# Patient Record
Sex: MALE | Race: WHITE | Employment: FULL TIME | URBAN - METROPOLITAN AREA
[De-identification: names, ages, dates, MRNs, and addresses within clinical notes are randomized per-mention and may not be internally consistent; named-entity substitution may affect disease eponyms.]

---

## 2022-03-10 ENCOUNTER — APPOINTMENT (OUTPATIENT)
Dept: GENERAL RADIOLOGY | Age: 22
End: 2022-03-10
Attending: PHYSICIAN ASSISTANT

## 2022-03-10 ENCOUNTER — HOSPITAL ENCOUNTER (EMERGENCY)
Age: 22
Discharge: HOME | End: 2022-03-10
Attending: STUDENT IN AN ORGANIZED HEALTH CARE EDUCATION/TRAINING PROGRAM

## 2022-03-10 VITALS
SYSTOLIC BLOOD PRESSURE: 166 MMHG | BODY MASS INDEX: 33.23 KG/M2 | HEART RATE: 90 BPM | TEMPERATURE: 98.1 F | WEIGHT: 245.37 LBS | HEIGHT: 72 IN | DIASTOLIC BLOOD PRESSURE: 98 MMHG | RESPIRATION RATE: 18 BRPM | OXYGEN SATURATION: 99 %

## 2022-03-10 DIAGNOSIS — S61.452A ANIMAL BITE OF LEFT HAND, INITIAL ENCOUNTER: Primary | ICD-10-CM
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
## 2022-03-10 DIAGNOSIS — S61.451A ANIMAL BITE OF RIGHT HAND, INITIAL ENCOUNTER: ICD-10-CM

## 2022-03-10 PROCEDURE — 90471 IMMUNIZATION ADMIN: CPT

## 2022-03-10 PROCEDURE — 90675 RABIES VACCINE IM: CPT | Performed by: PHYSICIAN ASSISTANT

## 2022-03-10 PROCEDURE — 96372 THER/PROPH/DIAG INJ SC/IM: CPT

## 2022-03-10 PROCEDURE — 73130 X-RAY EXAM OF HAND: CPT

## 2022-03-10 PROCEDURE — 75810000293 HC SIMP/SUPERF WND  RPR

## 2022-03-10 PROCEDURE — 74011250637 HC RX REV CODE- 250/637: Performed by: PHYSICIAN ASSISTANT

## 2022-03-10 PROCEDURE — 90375 RABIES IG IM/SC: CPT | Performed by: PHYSICIAN ASSISTANT

## 2022-03-10 PROCEDURE — 74011250636 HC RX REV CODE- 250/636: Performed by: PHYSICIAN ASSISTANT

## 2022-03-10 PROCEDURE — 74011000250 HC RX REV CODE- 250: Performed by: PHYSICIAN ASSISTANT

## 2022-03-10 PROCEDURE — 99284 EMERGENCY DEPT VISIT MOD MDM: CPT

## 2022-03-10 RX ORDER — LIDOCAINE HYDROCHLORIDE 10 MG/ML
5 INJECTION, SOLUTION EPIDURAL; INFILTRATION; INTRACAUDAL; PERINEURAL ONCE
Status: COMPLETED | OUTPATIENT
Start: 2022-03-10 | End: 2022-03-10

## 2022-03-10 RX ORDER — AMOXICILLIN AND CLAVULANATE POTASSIUM 875; 125 MG/1; MG/1
1 TABLET, FILM COATED ORAL 2 TIMES DAILY
Qty: 14 TABLET | Refills: 0 | Status: SHIPPED | OUTPATIENT
Start: 2022-03-10 | End: 2022-03-17

## 2022-03-10 RX ORDER — BACITRACIN 500 UNIT/G
1 PACKET (EA) TOPICAL
Status: COMPLETED | OUTPATIENT
Start: 2022-03-10 | End: 2022-03-10

## 2022-03-10 RX ORDER — AMOXICILLIN AND CLAVULANATE POTASSIUM 875; 125 MG/1; MG/1
1 TABLET, FILM COATED ORAL
Status: COMPLETED | OUTPATIENT
Start: 2022-03-10 | End: 2022-03-10

## 2022-03-10 RX ADMIN — RABIES VACCINE 2.5 UNITS: KIT at 10:07

## 2022-03-10 RX ADMIN — AMOXICILLIN AND CLAVULANATE POTASSIUM 1 TABLET: 875; 125 TABLET, FILM COATED ORAL at 10:23

## 2022-03-10 RX ADMIN — RABIES IMMUNE GLOBULIN (HUMAN) 2220 UNITS: 300 INJECTION, SOLUTION INFILTRATION; INTRAMUSCULAR at 10:14

## 2022-03-10 RX ADMIN — BACITRACIN 1 PACKET: 500 OINTMENT TOPICAL at 10:36

## 2022-03-10 RX ADMIN — LIDOCAINE HYDROCHLORIDE 5 ML: 10 INJECTION, SOLUTION EPIDURAL; INFILTRATION; INTRACAUDAL; PERINEURAL at 10:13

## 2022-03-10 NOTE — DISCHARGE INSTRUCTIONS
You will receive a phone call from the outpatient infusion center scheduling to set up your next rabies vaccine at the outpatient infusion center which will need to be given on Talha 3/13. The next rabies vaccine shot will need to be given on Thursday 3/17, and Thursday 3/24. Return to the emergency department if there are any issues getting the series completed or if problems arise.
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
## 2022-03-10 NOTE — PROGRESS NOTES
3/10/2022 -   Date of previous inpatient admission/ ED visit? N/A    What brought the patient back to ED? Raccoon Bite    Did patient decline recommended services during last admission/ ED visit (if yes, what)? N/A    Has patient seen a provider since their last inpatient admission/ED visit (if yes, when)? PCP: First and Last name:   Name of Practice:    Are you a current patient: Yes/No:    Approximate date of last visit:    CM Interventions:  From previous inpatient admission/ED visit:  From current inpatient admission/ED visit  CM notes CM Consult for Rabies Vaccine Setup. CM met with patient, with patient alert and oriented x4. Patient confirmed Name, , ins information, address, and phone number. CM confirmed that hard script has been scanned to patient's Media. CM discussed patient's ability to contact ins company to confirm most cost effective method of obtaining remaining needed rabies vaccines. CM identified that [employer] Team will contact patient within the next business day to set up the remaining needed rabies vaccines, if the patient chooses to utilize [employer]. Patient expressed understanding of the above. Patient has physical hard script for vaccines for discharge and is aware of ability to take the hard script to any vaccine provider should they choose to not utilize [employer] services. CM submitted scheduling request to [employer] [address] [employer] [employer] [address] Team via email. [facility] information is on AVS, including request to contact [facility] if patient has not received a call within one business day and the below dates. Day 0: 3/10/2022  Day 3: 3/13/2022  Day 7: 3/17/2022  Day 14: 3/24/2022    Address: [address],[facility], [place], [address]   P: [phone]  Patient does not have a PCP. Patient is not insured   Patient is independent in ADLs, to include driving. 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. Patient has no Home DME.     Patient has no pharmacy preference. Patient has no hx of HH or Rehab.     CRM: Miquel Coy, MPH, [address] [place] [address]; Z: [phone]

## 2022-03-10 NOTE — ED NOTES
Telfa pad applied to L 2nd finger and finger splint applied. Telfa pad applied to R hand and arm and wrapped in haylie.

## 2022-03-13 ENCOUNTER — HOSPITAL ENCOUNTER (OUTPATIENT)
Dept: INFUSION THERAPY | Age: 22
Discharge: HOME | End: 2022-03-13

## 2022-03-13 VITALS
RESPIRATION RATE: 18 BRPM | SYSTOLIC BLOOD PRESSURE: 142 MMHG | DIASTOLIC BLOOD PRESSURE: 82 MMHG | HEART RATE: 82 BPM | TEMPERATURE: 97.6 F | OXYGEN SATURATION: 98 %

## 2022-03-13 PROCEDURE — 90675 RABIES VACCINE IM: CPT | Performed by: STUDENT IN AN ORGANIZED HEALTH CARE EDUCATION/TRAINING PROGRAM

## 2022-03-13 PROCEDURE — 90471 IMMUNIZATION ADMIN: CPT

## 2022-03-13 PROCEDURE — 74011250636 HC RX REV CODE- 250/636: Performed by: STUDENT IN AN ORGANIZED HEALTH CARE EDUCATION/TRAINING PROGRAM

## 2022-03-13 RX ADMIN — RABIES VACCINE 2.5 UNITS: KIT at 09:54

## 2022-03-13 NOTE — PROGRESS NOTES
hospitals Progress Note    Date: 2022    Name: Shirlene Tabor    MRN: [id number]         : 2000    Mr. Melania Holman Arrived ambulatory and in no distress for Day 3 of rabies injection. Assessment was completed, no acute issues at this time, no new complaints voiced. No flowsheet data found. Mr. Jhoan Vaz vitals were reviewed. Visit Vitals  BP (!) 142/82   Pulse 82   Temp 97.6 °F (36.4 °C)   Resp 18   SpO2 98%       Lab results were obtained and reviewed. No results found for this or any previous visit (from the past 12 hour(s)). Medications:  Medications Administered     rabies vaccine, PCEC (RABAVERT) kit 2.5 Units     Admin Date  2022 Action  Given Dose  2.5 Units Route  IntraMUSCular Administered By  Jaycob Zelaya RN              Injection given in RUE. Mr. Melania Holman tolerated treatment well and was discharged from [place] [address] in stable condition at 1382. He is to return on  for his next appointment.     Jackelyn Knight RN  2022

## 2022-03-17 ENCOUNTER — HOSPITAL ENCOUNTER (OUTPATIENT)
Dept: INFUSION THERAPY | Age: 22
Discharge: HOME | End: 2022-03-17

## 2022-03-17 VITALS
HEART RATE: 73 BPM | TEMPERATURE: 96.9 F | SYSTOLIC BLOOD PRESSURE: 155 MMHG | DIASTOLIC BLOOD PRESSURE: 77 MMHG | RESPIRATION RATE: 16 BRPM

## 2022-03-17 PROCEDURE — 90471 IMMUNIZATION ADMIN: CPT

## 2022-03-17 PROCEDURE — 74011250636 HC RX REV CODE- 250/636: Performed by: STUDENT IN AN ORGANIZED HEALTH CARE EDUCATION/TRAINING PROGRAM

## 2022-03-17 PROCEDURE — 90675 RABIES VACCINE IM: CPT | Performed by: STUDENT IN AN ORGANIZED HEALTH CARE EDUCATION/TRAINING PROGRAM

## 2022-03-17 RX ADMIN — RABIES VACCINE 2.5 UNITS: KIT at 17:26

## 2022-03-17 NOTE — PROGRESS NOTES
[facility] Progress Note    Date: March 17, 2022        1725: Pt arrived ambulatory to [facility] for Rabies Vaccine/Day 3 in stable condition. Assessment completed. Patient denies any symptoms of COVID-19, including SOB, coughing, fever, or generally not feeling well. Patient denies any recent exposure to COVID-19. Patient denies any recent contact with family or friends that have a pending COVID-19 test.      Visit Vitals  BP (!) 155/77 (BP 1 Location: Right upper arm, BP Patient Position: Sitting)   Pulse 73   Temp 96.9 °F (36.1 °C)   Resp 16        Medications Administered     rabies vaccine, PCEC (RABAVERT) kit 2.5 Units     Admin Date  03/17/2022 Action  Given Dose  2.5 Units Route  IntraMUSCular Administered By  Chika Quijano RN                Given in the Right Deltoid IM     Mr. Ammy Scales tolerated the treatment well, and had no complaints. Mr. Ammy Scales was discharged from [place] [address] in stable condition. Patient is aware of next scheduled [facility] appointment.     Future Appointments   Date Time Provider Darcy Dominguez   3/24/2022  5:00 PM [address]            Luda Sethi RN  March 17, 2022

## 2022-03-24 ENCOUNTER — HOSPITAL ENCOUNTER (OUTPATIENT)
Dept: INFUSION THERAPY | Age: 22
Discharge: HOME | End: 2022-03-24

## 2022-03-24 VITALS
SYSTOLIC BLOOD PRESSURE: 146 MMHG | HEART RATE: 82 BPM | TEMPERATURE: 97.5 F | RESPIRATION RATE: 16 BRPM | DIASTOLIC BLOOD PRESSURE: 72 MMHG

## 2022-03-24 PROCEDURE — 90471 IMMUNIZATION ADMIN: CPT

## 2022-03-24 PROCEDURE — 90675 RABIES VACCINE IM: CPT | Performed by: STUDENT IN AN ORGANIZED HEALTH CARE EDUCATION/TRAINING PROGRAM

## 2022-03-24 PROCEDURE — 74011250636 HC RX REV CODE- 250/636: Performed by: STUDENT IN AN ORGANIZED HEALTH CARE EDUCATION/TRAINING PROGRAM

## 2022-03-24 RX ADMIN — RABIES VACCINE 2.5 UNITS: KIT at 17:21

## 2022-03-24 NOTE — PROGRESS NOTES
[facility] Progress Note    Date: March 24, 2022        1710: Pt arrived ambulatory to [facility] for Rabies Vaccine Day 14 in stable condition. Visit Vitals  BP (!) 146/72   Pulse 82   Temp 97.5 °F (36.4 °C)   Resp 16      Medications Administered     rabies vaccine, PCEC (RABAVERT) kit 2.5 Units     Admin Date  03/24/2022 Action  Given Dose  2.5 Units Route  IntraMUSCular Administered By  Elizabet Chappell RN            Given in the Right Deltoid IM     Mr. Liv Masters tolerated the treatment well, and had no complaints. Mr. Liv Masters was discharged from [place] [address] in stable condition. Patient is aware of next scheduled [facility] appointment.           Daphne Anguiano RN  March 24, 2022

## 2023-07-24 ENCOUNTER — HOSPITAL ENCOUNTER (EMERGENCY)
Facility: HOSPITAL | Age: 23
Discharge: HOME | End: 2023-07-24

## 2023-07-24 VITALS
SYSTOLIC BLOOD PRESSURE: 125 MMHG | HEART RATE: 80 BPM | RESPIRATION RATE: 16 BRPM | TEMPERATURE: 98.8 F | DIASTOLIC BLOOD PRESSURE: 83 MMHG | BODY MASS INDEX: 32.62 KG/M2 | WEIGHT: 233.03 LBS | HEIGHT: 71 IN | OXYGEN SATURATION: 99 %

## 2023-07-24 DIAGNOSIS — L98.8 PILONIDAL DISEASE: Primary | ICD-10-CM

## 2023-07-24 PROCEDURE — 6370000000 HC RX 637 (ALT 250 FOR IP): Performed by: PHYSICIAN ASSISTANT

## 2023-07-24 PROCEDURE — 99283 EMERGENCY DEPT VISIT LOW MDM: CPT

## 2023-07-24 RX ORDER — HYDROCODONE BITARTRATE AND ACETAMINOPHEN 5; 325 MG/1; MG/1
1 TABLET ORAL
Status: COMPLETED | OUTPATIENT
Start: 2023-07-24 | End: 2023-07-24

## 2023-07-24 RX ORDER — HYDROCODONE BITARTRATE AND ACETAMINOPHEN 5; 325 MG/1; MG/1
1 TABLET ORAL EVERY 6 HOURS PRN
Qty: 5 TABLET | Refills: 0 | Status: SHIPPED | OUTPATIENT
Start: 2023-07-24 | End: 2023-07-27

## 2023-07-24 RX ORDER — CEPHALEXIN 250 MG/1
500 CAPSULE ORAL
Status: COMPLETED | OUTPATIENT
Start: 2023-07-24 | End: 2023-07-24

## 2023-07-24 RX ORDER — DICLOFENAC SODIUM 75 MG/1
75 TABLET, DELAYED RELEASE ORAL 2 TIMES DAILY
Qty: 30 TABLET | Refills: 0 | Status: SHIPPED | OUTPATIENT
Start: 2023-07-24

## 2023-07-24 RX ORDER — SULFAMETHOXAZOLE AND TRIMETHOPRIM 800; 160 MG/1; MG/1
1 TABLET ORAL
Status: COMPLETED | OUTPATIENT
Start: 2023-07-24 | End: 2023-07-24

## 2023-07-24 RX ORDER — CEPHALEXIN 500 MG/1
500 CAPSULE ORAL 4 TIMES DAILY
Qty: 28 CAPSULE | Refills: 0 | Status: SHIPPED | OUTPATIENT
Start: 2023-07-24 | End: 2023-07-31

## 2023-07-24 RX ORDER — SULFAMETHOXAZOLE AND TRIMETHOPRIM 800; 160 MG/1; MG/1
1 TABLET ORAL 2 TIMES DAILY
Qty: 14 TABLET | Refills: 0 | Status: SHIPPED | OUTPATIENT
Start: 2023-07-24 | End: 2023-07-31

## 2023-07-24 RX ADMIN — CEPHALEXIN 500 MG: 250 CAPSULE ORAL at 11:34

## 2023-07-24 RX ADMIN — SULFAMETHOXAZOLE AND TRIMETHOPRIM 1 TABLET: 800; 160 TABLET ORAL at 11:34

## 2023-07-24 RX ADMIN — HYDROCODONE BITARTRATE AND ACETAMINOPHEN 1 TABLET: 5; 325 TABLET ORAL at 11:34

## 2023-07-24 ASSESSMENT — PAIN SCALES - GENERAL: PAINLEVEL_OUTOF10: 7

## 2023-07-24 NOTE — DISCHARGE INSTRUCTIONS
Antibiotics as prescribed  Alternate diclofenac and Tylenol as directed as needed for moderate pain  Lortab as directed, as needed for severe pain (be careful this medication may cause drowsiness, therefore do not take with other sedating substances)  Return precautions as we discussed    Thank You! It was a pleasure taking care of you in our Emergency Department today. We know that when you come to [facility], you are entrusting us with your health, comfort, and safety. Our clinicians honor that trust, and truly appreciate the opportunity to care for you and your loved ones. We also value your feedback. If you receive a survey about your Emergency Department experience today, please fill it out. We care about our patients' feedback, and we listen to what you have to say. Thank you.     Yolanda Neil PA-C